# Patient Record
Sex: MALE | Race: BLACK OR AFRICAN AMERICAN
[De-identification: names, ages, dates, MRNs, and addresses within clinical notes are randomized per-mention and may not be internally consistent; named-entity substitution may affect disease eponyms.]

---

## 2023-10-13 ENCOUNTER — APPOINTMENT (OUTPATIENT)
Dept: PAIN MANAGEMENT | Facility: CLINIC | Age: 73
End: 2023-10-13
Payer: MEDICARE

## 2023-10-13 VITALS — HEIGHT: 70 IN | WEIGHT: 196 LBS | BODY MASS INDEX: 28.06 KG/M2

## 2023-10-13 PROCEDURE — 99204 OFFICE O/P NEW MOD 45 MIN: CPT

## 2023-11-14 ENCOUNTER — APPOINTMENT (OUTPATIENT)
Dept: PAIN MANAGEMENT | Facility: CLINIC | Age: 73
End: 2023-11-14
Payer: MEDICARE

## 2023-11-14 PROCEDURE — 64483 NJX AA&/STRD TFRM EPI L/S 1: CPT | Mod: 50

## 2023-11-14 PROCEDURE — J3490M: CUSTOM

## 2023-11-29 ENCOUNTER — APPOINTMENT (OUTPATIENT)
Dept: PAIN MANAGEMENT | Facility: CLINIC | Age: 73
End: 2023-11-29
Payer: MEDICARE

## 2023-11-29 VITALS — HEIGHT: 70 IN | BODY MASS INDEX: 28.06 KG/M2 | WEIGHT: 196 LBS

## 2023-11-29 PROCEDURE — 99214 OFFICE O/P EST MOD 30 MIN: CPT

## 2023-12-04 ENCOUNTER — APPOINTMENT (OUTPATIENT)
Dept: PAIN MANAGEMENT | Facility: CLINIC | Age: 73
End: 2023-12-04
Payer: MEDICARE

## 2023-12-04 VITALS — HEIGHT: 70 IN | WEIGHT: 196 LBS | BODY MASS INDEX: 28.06 KG/M2

## 2023-12-04 PROCEDURE — 99214 OFFICE O/P EST MOD 30 MIN: CPT

## 2024-02-20 NOTE — PHYSICAL EXAM
[de-identified] : Gen: NAD Head: NC/AT Eyes: no glasses, no scleral icterus ENT: mucous membranes moist CV: RRR, S1 S2, no mrg Lungs: CTAB, nonlabored breathing Abd: soft, NT/ND Ext: full ROM in all extremities, no peripheral edema Back: +TTP in the right low lumbar facet region; +SLR on the right Neuro: CN intact LEs +5 L +5 R hip flexion +5 L +5 R leg extension +5 L +5 R leg flexion +5 L +5 R foot dorsiflexion +5 L +5 R foot plantarflexion +5 L +5 R EHL extension Psych: normal affect Skin: no visible lesions

## 2024-02-20 NOTE — HISTORY OF PRESENT ILLNESS
[FreeTextEntry1] : 12/4/2023: LARRY LIM is a 73 year-old man presenting for a RPV for a history of chronic low back pain with radicular features. The patient underwent a BILATERAL L4-L5 TFESI on 11/14/2023 with Dr. Vu that provided 50% relief of his pain. However, the patient has continued to have significant pain in the RLE. The patient notes having pain in the right low back with radiation to the right posterolateral thigh and anterolateral leg. He notes having some numbness and tingling over this distribution. The patient has been having pain in the low back that has been severe over the past six months.  The patient states that average pain over the past week was 10/10 in severity. Mood: No depression or anxiety at this time.  Sleep: Patient denies difficulty with sleep initiation or maintenance at this time.       [] : no [FreeTextEntry6] : weakness

## 2024-02-20 NOTE — DATA REVIEWED
[FreeTextEntry1] : 6/20/2023: FINDINGS: OSSEOUS STRUCTURES: Vertebral body heights are preserved. Intraosseous hemangioma in the L4 vertebral body, and Modic signal change in the opposing endplates at L5-S1.  ALIGNMENT: Normal lumbar lordosis is preserved.  No significant scoliosis. Facet arthropathy at L2-L3 through L4-L5 with 1 mm anterolisthesis at these 3 levels. No spondylolysis within the limitations of MRI.  SPINAL CORD AND CONUS MEDULLARIS: Normal signal.  PARASPINAL AND INTRA-ABDOMINAL SOFT TISSUES: Unremarkable.  INCLUDED THORACIC SPINE AND SACRUM: Unremarkable.  DISCS: Degenerative disc disease at L1-L2 through L5-S1 with moderate disc space narrowing at L5-S1.  The following axial levels are imaged and detailed below:  L1-L2: No disc bulging or herniation. No spinal canal or foraminal stenosis. L2-L3: No disc bulging or herniation. No spinal canal or foraminal stenosis. L3-L4: No disc bulging or herniation. No spinal canal or foraminal stenosis. L4-L5: Mild asymmetric bulging of the disc annulus to the right. No spinal canal or foraminal stenosis. L5-S1: No disc bulging or herniation. No spinal canal or foraminal stenosis.  IMPRESSION:  MRI of the lumbar spine demonstrates: Chronic degenerative changes, as described above. No disc herniation, foraminal stenosis, or spinal stenosis  **in my personal review of the images, the patient does have a broad based disc herniation at L5-S1 in the midline**

## 2024-02-20 NOTE — DISCUSSION/SUMMARY
[de-identified] : LARRY LIM is a 73 year-old man presenting for a RPV for a history of chronic low back pain with radicular features.   Prior treatment: 11/14/2023: BILATERAL L4-L5 TFESI on 11/14/2023 provided 50% relief of his pain Excedrin  Plan: 1) MRI lumbar spine images reviewed with the patient. Patient DOES have a broad based disc herniation at L5-S1, which was not noted on the radiology report from [facility]. 2) Schedule L5-S1 ILESI w/o MAC. The procedure was explained to the patient in detail. Reviewed risks, benefits, and alternatives with the patient. The patient expressed understanding and would like to proceed.  3) Continue excedrin prn 4) Continue home exercise regimen 5) RTC post procedure  Addendum: Initial injection preformed on 11/14/2023 directly and significantly provided improvement of condition being treated. Injection provided at least 50% of sustained improvement in pain relief and/or improvement in function measured from baseline using same scale for at least 3 months.

## 2024-03-26 ENCOUNTER — APPOINTMENT (OUTPATIENT)
Dept: PAIN MANAGEMENT | Facility: CLINIC | Age: 74
End: 2024-03-26
Payer: MEDICARE

## 2024-03-26 PROCEDURE — 62323 NJX INTERLAMINAR LMBR/SAC: CPT

## 2024-03-26 NOTE — PROCEDURE
[FreeTextEntry1] : L5-S1 lumbar interlaminar epidural steroid injection [FreeTextEntry2] : chronic lumbar radiculopathy [FreeTextEntry3] : Procedure Date: 03/26/2024   Preoperative Diagnosis: chronic low back pain with bilateral sciatica   Procedure: L5-S1 epidural steroid injection under fluoroscopic guidance   Anesthesia: local   Complications: none   EBL: none   Procedure in detail: Patient was seen and examined. Risks, benefits, and alternatives for the procedure were discussed with the patient in detail. The patient expressed understanding, gave written and verbal consent, and placed themselves in a prone position on the procedure table. Skin overlying the lumbosacral spine was prepped with chloraprep and draped in the usual sterile fashion. Fluoroscopic images were obtained to identify the L5 and S1 vertebral body. Target was the interlaminar space between the L5 and S1 vertebral body. Skin overlying the target was marked and infiltrated with 1% lidocaine. Using an 20 gauge, 9cm Tuohy needle, this was inserted and advanced under intermittent fluoroscopic guidance. When felt to be engaged in the epidural space, lateral view was used to confirm depth. Needle was advanced until loss of resistance to saline was achieved. After negative aspiration for heme/csf, contrast was injected under live fluoroscopy, which showed contrast spread consistent with epidural flow. No evidence of intravascular or intrathecal uptake. At this point, a total of 3ml of injectate, which consisted of 1ml of 80mg/ml depomedrol and 2ml of 0.25% bupivacaine were injected. The needle was restyletted and withdrawn, a band aid was placed over the injection site. Patient tolerated the procedure well. The patient recovered uneventfully and was discharged home in stable condition.

## 2024-04-08 ENCOUNTER — APPOINTMENT (OUTPATIENT)
Dept: PAIN MANAGEMENT | Facility: CLINIC | Age: 74
End: 2024-04-08
Payer: MEDICARE

## 2024-04-08 VITALS — BODY MASS INDEX: 27.77 KG/M2 | WEIGHT: 194 LBS | HEIGHT: 70 IN

## 2024-04-08 PROCEDURE — 99214 OFFICE O/P EST MOD 30 MIN: CPT

## 2024-04-08 RX ORDER — DICLOFENAC SODIUM 75 MG/1
75 TABLET, DELAYED RELEASE ORAL
Qty: 60 | Refills: 1 | Status: ACTIVE | COMMUNITY
Start: 2024-04-08 | End: 1900-01-01

## 2024-04-08 RX ORDER — CYCLOBENZAPRINE HYDROCHLORIDE 5 MG/1
5 TABLET, FILM COATED ORAL TWICE DAILY
Qty: 60 | Refills: 1 | Status: DISCONTINUED | COMMUNITY
Start: 2024-04-08 | End: 2024-04-08

## 2024-04-08 NOTE — DATA REVIEWED
[MRI] : MRI [Lumbar Spine] : lumbar spine [Report was reviewed and noted in the chart] : The report was reviewed and noted in the chart [I independently reviewed and interpreted images and report] : I independently reviewed and interpreted images and report [FreeTextEntry1] : 6/20/2023: FINDINGS: OSSEOUS STRUCTURES: Vertebral body heights are preserved. Intraosseous hemangioma in the L4 vertebral body, and Modic signal change in the opposing endplates at L5-S1.  ALIGNMENT: Normal lumbar lordosis is preserved.  No significant scoliosis. Facet arthropathy at L2-L3 through L4-L5 with 1 mm anterolisthesis at these 3 levels. No spondylolysis within the limitations of MRI.  SPINAL CORD AND CONUS MEDULLARIS: Normal signal.  PARASPINAL AND INTRA-ABDOMINAL SOFT TISSUES: Unremarkable.  INCLUDED THORACIC SPINE AND SACRUM: Unremarkable.  DISCS: Degenerative disc disease at L1-L2 through L5-S1 with moderate disc space narrowing at L5-S1.  The following axial levels are imaged and detailed below:  L1-L2: No disc bulging or herniation. No spinal canal or foraminal stenosis. L2-L3: No disc bulging or herniation. No spinal canal or foraminal stenosis. L3-L4: No disc bulging or herniation. No spinal canal or foraminal stenosis. L4-L5: Mild asymmetric bulging of the disc annulus to the right. No spinal canal or foraminal stenosis. L5-S1: No disc bulging or herniation. No spinal canal or foraminal stenosis.  IMPRESSION:  MRI of the lumbar spine demonstrates: Chronic degenerative changes, as described above. No disc herniation, foraminal stenosis, or spinal stenosis  **in my personal review of the images, the patient does have a broad based disc herniation at L5-S1 in the midline**

## 2024-04-08 NOTE — PHYSICAL EXAM
[de-identified] : Gen: NAD Head: NC/AT Eyes: no glasses, no scleral icterus ENT: mucous membranes moist CV: RRR, S1 S2, no mrg Lungs: CTAB, nonlabored breathing Abd: soft, NT/ND Ext: full ROM in all extremities, no peripheral edema Back: +TTP in the right low lumbar facet region; +SLR on the right Neuro: CN intact LEs +5 L +5 R hip flexion +5 L +5 R leg extension +5 L +5 R leg flexion +5 L +5 R foot dorsiflexion +5 L +5 R foot plantarflexion +5 L +5 R EHL extension Psych: normal affect Skin: no visible lesions

## 2024-04-08 NOTE — DISCUSSION/SUMMARY
[de-identified] : LARRY LIM is a 73 year-old man presenting for a RPV for a history of chronic low back pain with radicular features.   Prior treatment: 3/26/2024: L5-S1 ILESI w/o MAC w/ 50% improvement of pain and improvement of function 11/14/2023: BILATERAL L4-L5 TFESI on 11/14/2023 provided 50% relief of his pain Excedrin  Plan: 1) MRI lumbar spine images reviewed with the patient. Patient DOES have a broad based disc herniation at L5-S1, which was not noted on the radiology report from [facility]. 2) Trial diclofenac 75mg BID prn; Discussed the risks of NSAIDs, including worsening HTN, cardiovascular risks, GI risk, renal injury. The patient was told not to take other NSAIDs with this and to consult with their PCP if there is a significant medical history to warrant this prior to initiating treatment.  3) Trial methocarbamol 750mg BID prn; Discussed AEs, including sedation, dizziness, somnolence. Patient told to use caution when driving after taking the first few doses. 4) Start physical therapy - script provided 5) Continue home exercise regimen 6) Consider repeat L5-S1 ILESI or bilateral L5-S1 TFESI in the future if needed 7) RTC 2 months

## 2024-04-08 NOTE — HISTORY OF PRESENT ILLNESS
[Lower back] : lower back [Left Leg] : left leg [Right Leg] : right leg [10] : 10 [Dull/Aching] : dull/aching [Radiating] : radiating [Shooting] : shooting [Stabbing] : stabbing [Tingling] : tingling [Household chores] : household chores [Leisure] : leisure [Social interactions] : social interactions [Nothing helps with pain getting better] : Nothing helps with pain getting better [Buttock] : buttock [Burning] : burning [Intermittent] : intermittent [Sleep] : sleep [Sitting] : sitting [Standing] : standing [Walking] : walking [FreeTextEntry1] : 4/8/2024: LARRY LIM is a 73 year-old man presenting for a RPV for a history of chronic low back pain with radicular features. The patient underwent an L5-S1 ILESI w/o MAC on 3/26/2024. The patient notes modest (50%) improvement of pain since the procedure. He has been able to stand more upright since the procedure. He continues to have an aching pain, throbbing pain in the low back with radiation to the right posterolateral thigh and anterior leg as well as in this same distribution of the left lower extremity. He notes that he sometimes has tingling, burning, and numbness over this distribution. No focal weakness. No bowel or bladder incontinence or saddle anesthesia. Pain is worse with sitting or standing upright.  The patient states that average pain over the past week was 8/10 in severity. Mood: Patient denies depression or anxiety.  Sleep: Patient notes intermittent difficulty with sleep 2/2 pain.   12/4/2023: LARRY LIM is a 73 year-old man presenting for a RPV for a history of chronic low back pain with radicular features. The patient underwent a BILATERAL L4-L5 TFESI on 11/14/2023 with Dr. Vu that provided 50% relief of his pain. However, the patient has continued to have significant pain in the RLE. The patient notes having pain in the right low back with radiation to the right posterolateral thigh and anterolateral leg. He notes having some numbness and tingling over this distribution. The patient has been having pain in the low back that has been severe over the past six months.  The patient states that average pain over the past week was 10/10 in severity. Mood: No depression or anxiety at this time.  Sleep: Patient denies difficulty with sleep initiation or maintenance at this time.       [] : no [FreeTextEntry6] : weakness  [FreeTextEntry7] : b/l legs

## 2024-04-09 RX ORDER — TIZANIDINE 4 MG/1
4 TABLET ORAL TWICE DAILY
Qty: 60 | Refills: 2 | Status: ACTIVE | COMMUNITY
Start: 2024-04-09 | End: 1900-01-01

## 2024-04-09 RX ORDER — METHOCARBAMOL 750 MG/1
750 TABLET, FILM COATED ORAL TWICE DAILY
Qty: 60 | Refills: 1 | Status: DISCONTINUED | COMMUNITY
Start: 2024-04-08 | End: 2024-04-09

## 2024-05-31 PROBLEM — M51.9 LUMBAR DISC DISEASE: Status: ACTIVE | Noted: 2023-12-04

## 2024-05-31 PROBLEM — M54.17 LUMBOSACRAL RADICULITIS: Status: ACTIVE | Noted: 2023-10-13

## 2024-06-03 ENCOUNTER — NON-APPOINTMENT (OUTPATIENT)
Age: 74
End: 2024-06-03

## 2024-06-03 ENCOUNTER — APPOINTMENT (OUTPATIENT)
Dept: PAIN MANAGEMENT | Facility: CLINIC | Age: 74
End: 2024-06-03
Payer: MEDICARE

## 2024-06-03 VITALS — BODY MASS INDEX: 27.63 KG/M2 | HEIGHT: 70 IN | WEIGHT: 193 LBS

## 2024-06-03 DIAGNOSIS — M54.17 RADICULOPATHY, LUMBOSACRAL REGION: ICD-10-CM

## 2024-06-03 DIAGNOSIS — M51.9 UNSPECIFIED THORACIC, THORACOLUMBAR AND LUMBOSACRAL INTERVERTEBRAL DISC DISORDER: ICD-10-CM

## 2024-06-03 PROCEDURE — 99214 OFFICE O/P EST MOD 30 MIN: CPT

## 2024-06-04 NOTE — HISTORY OF PRESENT ILLNESS
[Lower back] : lower back [Buttock] : buttock [Left Leg] : left leg [Right Leg] : right leg [10] : 10 [Burning] : burning [Dull/Aching] : dull/aching [Radiating] : radiating [Shooting] : shooting [Stabbing] : stabbing [Tingling] : tingling [Intermittent] : intermittent [Household chores] : household chores [Leisure] : leisure [Sleep] : sleep [Social interactions] : social interactions [Nothing helps with pain getting better] : Nothing helps with pain getting better [Sitting] : sitting [Standing] : standing [Walking] : walking [FreeTextEntry1] : 6/3/2024 LARRY LIM is a 73 year-old man presenting for a RPV for a history of chronic low back pain with radicular features. The patient was started on diclofenac and methocarbamol at last visit. He was also given a script for physical therapy. The patient continues to have an aching pain in the low lumbar region with radiation to the right posterolateral thigh to the anterolateral leg and lateral ankle. No focal numbness but the patient notes having tingling over this area. No weakness in the lower extremities. No bowel or bladder incontinence or saddle anesthesia. He has been going to PT which helps with the pain.  The patient states that average pain over the past week was 10/10 in severity. Mood: Patient denies depression or anxiety.  Sleep: Patient notes intermittent difficulty with sleep 2/2 pain.   4/8/2024: LARRY LIM is a 73 year-old man presenting for a RPV for a history of chronic low back pain with radicular features. The patient underwent an L5-S1 ILESI w/o MAC on 3/26/2024. The patient notes modest (50%) improvement of pain since the procedure. He has been able to stand more upright since the procedure. He continues to have an aching pain, throbbing pain in the low back with radiation to the right posterolateral thigh and anterior leg as well as in this same distribution of the left lower extremity. He notes that he sometimes has tingling, burning, and numbness over this distribution. No focal weakness. No bowel or bladder incontinence or saddle anesthesia. Pain is worse with sitting or standing upright.  The patient states that average pain over the past week was 8/10 in severity. Mood: Patient denies depression or anxiety.  Sleep: Patient notes intermittent difficulty with sleep 2/2 pain.   12/4/2023: LARRY LIM is a 73 year-old man presenting for a RPV for a history of chronic low back pain with radicular features. The patient underwent a BILATERAL L4-L5 TFESI on 11/14/2023 with Dr. Vu that provided 50% relief of his pain. However, the patient has continued to have significant pain in the RLE. The patient notes having pain in the right low back with radiation to the right posterolateral thigh and anterolateral leg. He notes having some numbness and tingling over this distribution. The patient has been having pain in the low back that has been severe over the past six months.  The patient states that average pain over the past week was 10/10 in severity. Mood: No depression or anxiety at this time.  Sleep: Patient denies difficulty with sleep initiation or maintenance at this time.       [] : no [FreeTextEntry6] : weakness  [FreeTextEntry7] : b/l legs

## 2024-06-04 NOTE — PHYSICAL EXAM
[de-identified] : Gen: NAD Head: NC/AT Eyes: no glasses, no scleral icterus ENT: mucous membranes moist CV: RRR, S1 S2, no mrg Lungs: CTAB, nonlabored breathing Abd: soft, NT/ND Ext: full ROM in all extremities, no peripheral edema Back: +TTP in the right low lumbar facet region; +SLR on the right Neuro: CN intact LEs +5 L +5 R hip flexion +5 L +5 R leg extension +5 L +5 R leg flexion +5 L +5 R foot dorsiflexion +5 L +5 R foot plantarflexion +5 L +5 R EHL extension Psych: normal affect Skin: no visible lesions

## 2024-06-04 NOTE — DISCUSSION/SUMMARY
[de-identified] : LARRY LIM is a 73 year-old man presenting for a RPV for a history of chronic low back pain with radicular features.   Prior treatment: 3/26/2024: L5-S1 ILESI w/o MAC w/ 50% improvement of pain and improvement of function 11/14/2023: BILATERAL L4-L5 TFESI on 11/14/2023 provided 50% relief of his pain Excedrin Gabapentin Physical therapy  Plan: 1) MRI lumbar spine images reviewed with the patient. Patient DOES have a broad based disc herniation at L5-S1, which was not noted on the radiology report from [facility]. 2) Schedule RIGHT L4-L5, L5-S1 TFESI w/o MAC. The procedure was explained to the patient in detail. Reviewed risks, benefits, and alternatives with the patient. Some risks discussed included temporary increase in pain, bleeding, infection, and side effects from steroids. The patient expressed understanding and would like to proceed.  3) Continue diclofenac 75mg BID prn; denies AEs 4) Continue methocarbamol 750mg BID prn; denies AEs 5) Continue physical therapy 6) Continue home exercise regimen 7) RTC post procedure  Addendum 6/4/2024: The patient has completed over 6 weeks of a Home Exercise Program and is currently doing physical therapy with minimal relief.  The patient has completed over 3 weeks of NSAIDs with minimal relief. The patient has completed over 6 weeks of activity modification which includes rest, heat and ice with minimal relief.

## 2024-07-18 ENCOUNTER — NON-APPOINTMENT (OUTPATIENT)
Age: 74
End: 2024-07-18

## 2024-08-13 ENCOUNTER — APPOINTMENT (OUTPATIENT)
Dept: PAIN MANAGEMENT | Facility: CLINIC | Age: 74
End: 2024-08-13
Payer: MEDICARE

## 2024-08-13 DIAGNOSIS — M54.17 RADICULOPATHY, LUMBOSACRAL REGION: ICD-10-CM

## 2024-08-13 DIAGNOSIS — M51.9 UNSPECIFIED THORACIC, THORACOLUMBAR AND LUMBOSACRAL INTERVERTEBRAL DISC DISORDER: ICD-10-CM

## 2024-08-13 PROCEDURE — 64483 NJX AA&/STRD TFRM EPI L/S 1: CPT | Mod: RT

## 2024-08-13 PROCEDURE — 64484 NJX AA&/STRD TFRM EPI L/S EA: CPT | Mod: RT

## 2024-08-13 NOTE — PROCEDURE
[FreeTextEntry1] : RIGHT L4-L5, L5-S1 transforaminal epidural steroid injection [FreeTextEntry2] : chronic lumbar radiculopathy [FreeTextEntry3] : Procedure Date: 08/13/2024   Preoperative Diagnosis: chronic lumbar radiculopathy   Procedure: RIGHT L4-L5, L5-S1 transforaminal epidural steroid injection under fluoroscopic guidance  Anesthesia: local  Complications: none   EBL: none   Procedure in detail:   Patient was seen and examined. Risks, benefits, and alternatives for the procedure were discussed with the patient in detail. The patient expressed understanding, gave written and verbal consent, and placed themselves in a prone position on the procedure table. Skin overlying the lumbosacral spine was prepped with chloraprep and draped in the usual sterile fashion. Fluoroscopic images were obtained to identify the L4 and L5 vertebral bodies. Target was the 6 o'clock position under the RIGHT pedicle at the L4 and L5 vertebral level. Skin overlying the target was marked and infiltrated with 1% lidocaine. Using two 22 gauge, 5 inch spinal needles, these were inserted and advanced under intermittent fluoroscopic guidance. When felt to be engaged in the epidural space, lateral view was used to confirm depth. After negative aspiration for heme/csf, contrast was injected under live fluoroscopy, which showed contrast spread consistent with epidural flow. No evidence of intravascular or intrathecal uptake. At this point, a total of 2ml of injectate, which consisted of 1ml of 6mg/ml betamethasone and 1ml of normal saline were injected through each needle. The needles were restyletted and withdrawn, a band aid was placed over the injection site. Patient tolerated the procedure well. The patient recovered uneventfully and was discharged home in stable condition.

## 2024-08-27 ENCOUNTER — APPOINTMENT (OUTPATIENT)
Dept: PAIN MANAGEMENT | Facility: CLINIC | Age: 74
End: 2024-08-27

## 2024-08-29 ENCOUNTER — APPOINTMENT (OUTPATIENT)
Dept: PAIN MANAGEMENT | Facility: CLINIC | Age: 74
End: 2024-08-29
Payer: MEDICARE

## 2024-08-29 VITALS — BODY MASS INDEX: 29.2 KG/M2 | HEIGHT: 70 IN | WEIGHT: 204 LBS

## 2024-08-29 DIAGNOSIS — M54.17 RADICULOPATHY, LUMBOSACRAL REGION: ICD-10-CM

## 2024-08-29 DIAGNOSIS — M51.9 UNSPECIFIED THORACIC, THORACOLUMBAR AND LUMBOSACRAL INTERVERTEBRAL DISC DISORDER: ICD-10-CM

## 2024-08-29 PROCEDURE — 99214 OFFICE O/P EST MOD 30 MIN: CPT

## 2024-08-29 NOTE — DISCUSSION/SUMMARY
[de-identified] : LARRY LIM is a 73 year-old man presenting for a RPV for a history of chronic low back pain with radicular features.   Prior treatment: 8/13/2024 RIGHT L4-L5, L5-S1 TFESI w/o MAC w/ 50% improvement of pain and function  3/26/2024: L5-S1 ILESI w/o MAC w/ 50% improvement of pain and improvement of function 11/14/2023: BILATERAL L4-L5 TFESI on 11/14/2023 provided 50% relief of his pain Excedrin Gabapentin Physical therapy The patient has completed over 6 weeks of a Home Exercise Program and is currently doing physical therapy with minimal relief.  The patient has completed over 3 weeks of NSAIDs with minimal relief. The patient has completed over 6 weeks of activity modification which includes rest, heat and ice with minimal relief. The patient had a 2 level transforaminal in 2019 where he sustained 50% relief for greater than 3 months.  Plan: 1) MRI lumbar spine images reviewed with the patient. Patient DOES have a broad based disc herniation at L5-S1, which was not noted on the radiology report from [facility]. 2) Schedule L5-S1 ILESI w/o MAC. The procedure was explained to the patient in detail. Reviewed risks, benefits, and alternatives with the patient. Some risks discussed included temporary increase in pain, bleeding, infection, and side effects from steroids. The patient expressed understanding and would like to proceed.  3) Continue diclofenac 75mg BID prn; denies AEs 4) Continue methocarbamol 750mg BID prn; denies AEs 5) Continue physical therapy 6) Continue home exercise regimen 7) RTC post procedure

## 2024-08-29 NOTE — HISTORY OF PRESENT ILLNESS
[Lower back] : lower back [Buttock] : buttock [Left Leg] : left leg [Right Leg] : right leg [10] : 10 [Burning] : burning [Dull/Aching] : dull/aching [Radiating] : radiating [Shooting] : shooting [Stabbing] : stabbing [Tingling] : tingling [Intermittent] : intermittent [Household chores] : household chores [Leisure] : leisure [Sleep] : sleep [Social interactions] : social interactions [Nothing helps with pain getting better] : Nothing helps with pain getting better [Sitting] : sitting [Standing] : standing [Walking] : walking [FreeTextEntry1] : 8/29/2024 LARRY LIM is a 74 year-old man presenting for a RPV for a history of chronic low back pain with radicular features. The patient underwent a RIGHT L4-L5, L5-S1 TFESI w/o MAC on 8/13/2024. He notes having 50% improvement of pain and function since the procedure. He has had modest improvement in mobility. The patient continues to have an aching pain in the low lumbar region with radiation to the right posterior thigh and anterolateral leg. He notes intermittent tingling and numbness.   The patient states that average pain over the past week was 8/10 in severity.  Mood: Patient denies depression or anxiety.  Sleep: Patient notes intermittent difficulty with sleep 2/2 pain.   6/3/2024 LARRY LIM is a 73 year-old man presenting for a RPV for a history of chronic low back pain with radicular features. The patient was started on diclofenac and methocarbamol at last visit. He was also given a script for physical therapy. The patient continues to have an aching pain in the low lumbar region with radiation to the right posterolateral thigh to the anterolateral leg and lateral ankle. No focal numbness but the patient notes having tingling over this area. No weakness in the lower extremities. No bowel or bladder incontinence or saddle anesthesia. He has been going to PT which helps with the pain.  The patient states that average pain over the past week was 10/10 in severity. Mood: Patient denies depression or anxiety.  Sleep: Patient notes intermittent difficulty with sleep 2/2 pain.   4/8/2024: LARRY LIM is a 73 year-old man presenting for a RPV for a history of chronic low back pain with radicular features. The patient underwent an L5-S1 ILESI w/o MAC on 3/26/2024. The patient notes modest (50%) improvement of pain since the procedure. He has been able to stand more upright since the procedure. He continues to have an aching pain, throbbing pain in the low back with radiation to the right posterolateral thigh and anterior leg as well as in this same distribution of the left lower extremity. He notes that he sometimes has tingling, burning, and numbness over this distribution. No focal weakness. No bowel or bladder incontinence or saddle anesthesia. Pain is worse with sitting or standing upright.  The patient states that average pain over the past week was 8/10 in severity. Mood: Patient denies depression or anxiety.  Sleep: Patient notes intermittent difficulty with sleep 2/2 pain.   12/4/2023: LARRY LIM is a 73 year-old man presenting for a RPV for a history of chronic low back pain with radicular features. The patient underwent a BILATERAL L4-L5 TFESI on 11/14/2023 with Dr. Vu that provided 50% relief of his pain. However, the patient has continued to have significant pain in the RLE. The patient notes having pain in the right low back with radiation to the right posterolateral thigh and anterolateral leg. He notes having some numbness and tingling over this distribution. The patient has been having pain in the low back that has been severe over the past six months.  The patient states that average pain over the past week was 10/10 in severity. Mood: No depression or anxiety at this time.  Sleep: Patient denies difficulty with sleep initiation or maintenance at this time.       [] : no [FreeTextEntry6] : weakness  [FreeTextEntry7] : b/l legs

## 2024-08-29 NOTE — PHYSICAL EXAM
[de-identified] : Gen: NAD Head: NC/AT Eyes: no glasses, no scleral icterus ENT: mucous membranes moist CV: RRR, S1 S2, no mrg Lungs: CTAB, nonlabored breathing Abd: soft, NT/ND Ext: full ROM in all extremities, no peripheral edema Back: +TTP in the right low lumbar facet region; +SLR on the right Neuro: CN intact LEs +5 L +5 R hip flexion +5 L +5 R leg extension +5 L +5 R leg flexion +5 L +5 R foot dorsiflexion +5 L +5 R foot plantarflexion +5 L +5 R EHL extension Psych: normal affect Skin: no visible lesions

## 2024-09-03 NOTE — PHYSICAL EXAM
[de-identified] : Gen: NAD Head: NC/AT Eyes: no glasses, no scleral icterus ENT: mucous membranes moist CV: RRR, S1 S2, no mrg Lungs: CTAB, nonlabored breathing Abd: soft, NT/ND Ext: full ROM in all extremities, no peripheral edema Back: +TTP in the right low lumbar facet region; +SLR on the right Neuro: CN intact LEs +5 L +5 R hip flexion +5 L +5 R leg extension +5 L +5 R leg flexion +5 L +5 R foot dorsiflexion +5 L +5 R foot plantarflexion +5 L +5 R EHL extension Psych: normal affect Skin: no visible lesions

## 2024-09-03 NOTE — HISTORY OF PRESENT ILLNESS
[Lower back] : lower back [Buttock] : buttock [Left Leg] : left leg [Right Leg] : right leg [10] : 10 [Burning] : burning [Dull/Aching] : dull/aching [Radiating] : radiating [Shooting] : shooting [Stabbing] : stabbing [Tingling] : tingling [Intermittent] : intermittent [Household chores] : household chores [Leisure] : leisure [Sleep] : sleep [Social interactions] : social interactions [Nothing helps with pain getting better] : Nothing helps with pain getting better [Sitting] : sitting [Standing] : standing [Walking] : walking [FreeTextEntry1] : 08/26/2024 LARRY LIM is a 74 year-old man presenting for a RPV for a history of chronic low back pain with radicular features. 08/13/2024 RIGHT L4-L5, L5-S1 TFESI w/o MAC   The patient states that average pain over the past week was /10 in severity.   Mood: Sleep:  6/3/2024 LARRY LIM is a 73 year-old man presenting for a RPV for a history of chronic low back pain with radicular features. The patient was started on diclofenac and methocarbamol at last visit. He was also given a script for physical therapy. The patient continues to have an aching pain in the low lumbar region with radiation to the right posterolateral thigh to the anterolateral leg and lateral ankle. No focal numbness but the patient notes having tingling over this area. No weakness in the lower extremities. No bowel or bladder incontinence or saddle anesthesia. He has been going to PT which helps with the pain.  The patient states that average pain over the past week was 10/10 in severity. Mood: Patient denies depression or anxiety.  Sleep: Patient notes intermittent difficulty with sleep 2/2 pain.   4/8/2024: LARRY LIM is a 73 year-old man presenting for a RPV for a history of chronic low back pain with radicular features. The patient underwent an L5-S1 ILESI w/o MAC on 3/26/2024. The patient notes modest (50%) improvement of pain since the procedure. He has been able to stand more upright since the procedure. He continues to have an aching pain, throbbing pain in the low back with radiation to the right posterolateral thigh and anterior leg as well as in this same distribution of the left lower extremity. He notes that he sometimes has tingling, burning, and numbness over this distribution. No focal weakness. No bowel or bladder incontinence or saddle anesthesia. Pain is worse with sitting or standing upright.  The patient states that average pain over the past week was 8/10 in severity. Mood: Patient denies depression or anxiety.  Sleep: Patient notes intermittent difficulty with sleep 2/2 pain.   12/4/2023: LARRY LIM is a 73 year-old man presenting for a RPV for a history of chronic low back pain with radicular features. The patient underwent a BILATERAL L4-L5 TFESI on 11/14/2023 with Dr. Vu that provided 50% relief of his pain. However, the patient has continued to have significant pain in the RLE. The patient notes having pain in the right low back with radiation to the right posterolateral thigh and anterolateral leg. He notes having some numbness and tingling over this distribution. The patient has been having pain in the low back that has been severe over the past six months.  The patient states that average pain over the past week was 10/10 in severity. Mood: No depression or anxiety at this time.  Sleep: Patient denies difficulty with sleep initiation or maintenance at this time.       [] : no [FreeTextEntry6] : weakness  [FreeTextEntry7] : b/l legs

## 2024-12-02 ENCOUNTER — APPOINTMENT (OUTPATIENT)
Dept: PAIN MANAGEMENT | Facility: CLINIC | Age: 74
End: 2024-12-02
Payer: MEDICARE

## 2024-12-02 VITALS — BODY MASS INDEX: 29.35 KG/M2 | HEIGHT: 70 IN | WEIGHT: 205 LBS

## 2024-12-02 DIAGNOSIS — M54.17 RADICULOPATHY, LUMBOSACRAL REGION: ICD-10-CM

## 2024-12-02 DIAGNOSIS — M54.12 RADICULOPATHY, CERVICAL REGION: ICD-10-CM

## 2024-12-02 DIAGNOSIS — M51.9 UNSPECIFIED THORACIC, THORACOLUMBAR AND LUMBOSACRAL INTERVERTEBRAL DISC DISORDER: ICD-10-CM

## 2024-12-02 PROCEDURE — 99214 OFFICE O/P EST MOD 30 MIN: CPT

## 2024-12-09 ENCOUNTER — APPOINTMENT (OUTPATIENT)
Dept: MRI IMAGING | Facility: CLINIC | Age: 74
End: 2024-12-09
Payer: MEDICARE

## 2024-12-09 PROCEDURE — 72141 MRI NECK SPINE W/O DYE: CPT

## 2024-12-09 PROCEDURE — 72148 MRI LUMBAR SPINE W/O DYE: CPT

## 2025-01-06 ENCOUNTER — APPOINTMENT (OUTPATIENT)
Dept: PAIN MANAGEMENT | Facility: CLINIC | Age: 75
End: 2025-01-06

## 2025-01-06 DIAGNOSIS — M54.12 RADICULOPATHY, CERVICAL REGION: ICD-10-CM

## 2025-01-16 ENCOUNTER — APPOINTMENT (OUTPATIENT)
Dept: PAIN MANAGEMENT | Facility: CLINIC | Age: 75
End: 2025-01-16
Payer: MEDICARE

## 2025-01-16 VITALS — HEIGHT: 70 IN | BODY MASS INDEX: 29.35 KG/M2 | WEIGHT: 205 LBS

## 2025-01-16 DIAGNOSIS — M51.9 UNSPECIFIED THORACIC, THORACOLUMBAR AND LUMBOSACRAL INTERVERTEBRAL DISC DISORDER: ICD-10-CM

## 2025-01-16 DIAGNOSIS — M54.17 RADICULOPATHY, LUMBOSACRAL REGION: ICD-10-CM

## 2025-01-16 PROCEDURE — 99214 OFFICE O/P EST MOD 30 MIN: CPT

## 2025-01-16 RX ORDER — CYCLOBENZAPRINE HYDROCHLORIDE 5 MG/1
5 TABLET, FILM COATED ORAL TWICE DAILY
Qty: 60 | Refills: 1 | Status: ACTIVE | COMMUNITY
Start: 2025-01-16 | End: 1900-01-01

## 2025-02-11 ENCOUNTER — APPOINTMENT (OUTPATIENT)
Dept: PAIN MANAGEMENT | Facility: CLINIC | Age: 75
End: 2025-02-11
Payer: MEDICARE

## 2025-02-11 DIAGNOSIS — M51.9 UNSPECIFIED THORACIC, THORACOLUMBAR AND LUMBOSACRAL INTERVERTEBRAL DISC DISORDER: ICD-10-CM

## 2025-02-11 DIAGNOSIS — M54.17 RADICULOPATHY, LUMBOSACRAL REGION: ICD-10-CM

## 2025-02-11 PROCEDURE — 64483 NJX AA&/STRD TFRM EPI L/S 1: CPT | Mod: RT

## 2025-02-11 PROCEDURE — 64484 NJX AA&/STRD TFRM EPI L/S EA: CPT | Mod: 59,RT

## 2025-03-10 ENCOUNTER — APPOINTMENT (OUTPATIENT)
Dept: PAIN MANAGEMENT | Facility: CLINIC | Age: 75
End: 2025-03-10
Payer: MEDICARE

## 2025-03-10 VITALS — WEIGHT: 205 LBS | HEIGHT: 70 IN | BODY MASS INDEX: 29.35 KG/M2

## 2025-03-10 DIAGNOSIS — M54.17 RADICULOPATHY, LUMBOSACRAL REGION: ICD-10-CM

## 2025-03-10 DIAGNOSIS — M54.12 RADICULOPATHY, CERVICAL REGION: ICD-10-CM

## 2025-03-10 DIAGNOSIS — M51.9 UNSPECIFIED THORACIC, THORACOLUMBAR AND LUMBOSACRAL INTERVERTEBRAL DISC DISORDER: ICD-10-CM

## 2025-03-10 PROCEDURE — 99214 OFFICE O/P EST MOD 30 MIN: CPT

## 2025-04-04 ENCOUNTER — APPOINTMENT (OUTPATIENT)
Facility: CLINIC | Age: 75
End: 2025-04-04

## 2025-04-18 ENCOUNTER — APPOINTMENT (OUTPATIENT)
Dept: PAIN MANAGEMENT | Facility: CLINIC | Age: 75
End: 2025-04-18

## 2025-04-18 DIAGNOSIS — M54.17 RADICULOPATHY, LUMBOSACRAL REGION: ICD-10-CM

## 2025-04-18 DIAGNOSIS — M51.9 UNSPECIFIED THORACIC, THORACOLUMBAR AND LUMBOSACRAL INTERVERTEBRAL DISC DISORDER: ICD-10-CM

## 2025-04-18 DIAGNOSIS — M54.12 RADICULOPATHY, CERVICAL REGION: ICD-10-CM
